# Patient Record
Sex: MALE | Race: WHITE | Employment: FULL TIME
[De-identification: names, ages, dates, MRNs, and addresses within clinical notes are randomized per-mention and may not be internally consistent; named-entity substitution may affect disease eponyms.]

---

## 2017-02-01 ENCOUNTER — OFFICE VISIT (OUTPATIENT)
Dept: PAIN MANAGEMENT | Facility: CLINIC | Age: 37
End: 2017-02-01

## 2017-02-01 VITALS
HEIGHT: 69 IN | HEART RATE: 66 BPM | DIASTOLIC BLOOD PRESSURE: 91 MMHG | WEIGHT: 202 LBS | BODY MASS INDEX: 29.92 KG/M2 | RESPIRATION RATE: 14 BRPM | SYSTOLIC BLOOD PRESSURE: 140 MMHG

## 2017-02-01 DIAGNOSIS — M54.16 LUMBAR RADICULOPATHY, CHRONIC: Primary | ICD-10-CM

## 2017-02-01 DIAGNOSIS — M47.816 OSTEOARTHRITIS OF LUMBAR SPINE, UNSPECIFIED SPINAL OSTEOARTHRITIS COMPLICATION STATUS: ICD-10-CM

## 2017-02-01 DIAGNOSIS — M51.36 DEGENERATIVE LUMBAR DISC: ICD-10-CM

## 2017-02-01 DIAGNOSIS — M48.061 SPINAL STENOSIS, LUMBAR: ICD-10-CM

## 2017-02-01 DIAGNOSIS — M47.816 ARTHROPATHY OF LUMBAR FACET JOINT: ICD-10-CM

## 2017-02-01 LAB
AMPHETAMINE SCREEN, URINE: NEGATIVE
BARBITURATE SCREEN, URINE: NEGATIVE
BENZODIAZEPINE SCREEN, URINE: NEGATIVE
COCAINE METABOLITE SCREEN URINE: NEGATIVE
MDMA URINE: NORMAL
METHADONE SCREEN, URINE: NEGATIVE
METHAMPHETAMINE, URINE: NEGATIVE
OPIATE SCREEN URINE: NEGATIVE
OXYCODONE SCREEN URINE: NEGATIVE
PHENCYCLIDINE SCREEN URINE: NEGATIVE
PROPOXYPHENE SCREEN, URINE: NORMAL
THC: NEGATIVE
TRICYCLIC ANTIDEPRESSANTS, UR: NEGATIVE

## 2017-02-01 PROCEDURE — 80305 DRUG TEST PRSMV DIR OPT OBS: CPT | Performed by: PAIN MEDICINE

## 2017-02-01 PROCEDURE — 99204 OFFICE O/P NEW MOD 45 MIN: CPT | Performed by: PAIN MEDICINE

## 2017-02-01 RX ORDER — DULOXETIN HYDROCHLORIDE 30 MG/1
CAPSULE, DELAYED RELEASE ORAL
COMMUNITY
Start: 2017-01-19 | End: 2022-01-25

## 2017-02-01 RX ORDER — SIMVASTATIN 40 MG
40 TABLET ORAL NIGHTLY
COMMUNITY
Start: 2017-01-11 | End: 2018-10-25

## 2017-02-01 RX ORDER — AMLODIPINE BESYLATE 10 MG/1
TABLET ORAL
COMMUNITY
Start: 2016-12-12 | End: 2017-11-20 | Stop reason: ALTCHOICE

## 2017-02-01 RX ORDER — BUPROPION HYDROCHLORIDE 150 MG/1
TABLET ORAL EVERY MORNING
COMMUNITY
Start: 2017-01-24 | End: 2022-01-25

## 2017-02-01 RX ORDER — LISINOPRIL 40 MG/1
40 TABLET ORAL DAILY
COMMUNITY
Start: 2017-01-19

## 2017-02-01 RX ORDER — EZETIMIBE 10 MG/1
TABLET ORAL
COMMUNITY
Start: 2016-10-25 | End: 2017-11-20 | Stop reason: ALTCHOICE

## 2017-02-01 RX ORDER — RIZATRIPTAN BENZOATE 10 MG/1
TABLET ORAL
COMMUNITY
Start: 2016-12-20

## 2017-02-01 RX ORDER — OMEGA-3-ACID ETHYL ESTERS 1 G/1
2 CAPSULE, LIQUID FILLED ORAL 2 TIMES DAILY
COMMUNITY
Start: 2016-12-14 | End: 2022-01-25

## 2017-02-01 ASSESSMENT — ENCOUNTER SYMPTOMS
ABDOMINAL PAIN: 0
VOMITING: 0
EYE DISCHARGE: 0
NAUSEA: 0
EYES NEGATIVE: 1
BACK PAIN: 1
SHORTNESS OF BREATH: 0
CONSTIPATION: 0
PHOTOPHOBIA: 0

## 2017-11-09 ENCOUNTER — TELEPHONE (OUTPATIENT)
Dept: CARDIOLOGY CLINIC | Age: 37
End: 2017-11-09

## 2017-11-09 NOTE — TELEPHONE ENCOUNTER
Called pt twice to schedule an appt per William Dugan NP. Letter mailed to contact the office to schedule.

## 2017-11-20 VITALS
HEART RATE: 70 BPM | BODY MASS INDEX: 29.47 KG/M2 | SYSTOLIC BLOOD PRESSURE: 118 MMHG | WEIGHT: 199 LBS | OXYGEN SATURATION: 94 % | RESPIRATION RATE: 16 BRPM | DIASTOLIC BLOOD PRESSURE: 84 MMHG | HEIGHT: 69 IN

## 2017-11-20 RX ORDER — HYDROCHLOROTHIAZIDE 25 MG/1
1 TABLET ORAL DAILY
COMMUNITY
Start: 2017-11-01 | End: 2022-01-25

## 2017-11-20 RX ORDER — DULOXETIN HYDROCHLORIDE 60 MG/1
1 CAPSULE, DELAYED RELEASE ORAL DAILY
COMMUNITY
Start: 2017-10-16 | End: 2022-01-25

## 2017-11-30 ENCOUNTER — TELEPHONE (OUTPATIENT)
Dept: CARDIOLOGY CLINIC | Age: 37
End: 2017-11-30

## 2018-01-25 ENCOUNTER — OFFICE VISIT (OUTPATIENT)
Dept: CARDIOLOGY CLINIC | Age: 38
End: 2018-01-25
Payer: COMMERCIAL

## 2018-01-25 VITALS
HEART RATE: 68 BPM | HEIGHT: 70 IN | SYSTOLIC BLOOD PRESSURE: 108 MMHG | DIASTOLIC BLOOD PRESSURE: 60 MMHG | BODY MASS INDEX: 28.35 KG/M2 | WEIGHT: 198 LBS

## 2018-01-25 DIAGNOSIS — F41.9 ANXIETY: ICD-10-CM

## 2018-01-25 DIAGNOSIS — R00.2 PALPITATION: Primary | ICD-10-CM

## 2018-01-25 DIAGNOSIS — I10 ESSENTIAL HYPERTENSION: ICD-10-CM

## 2018-01-25 DIAGNOSIS — R94.31 ABNORMAL HOLTER EXAM: ICD-10-CM

## 2018-01-25 DIAGNOSIS — R00.0 INAPPROPRIATE SINUS TACHYCARDIA: ICD-10-CM

## 2018-01-25 PROCEDURE — 99203 OFFICE O/P NEW LOW 30 MIN: CPT | Performed by: INTERNAL MEDICINE

## 2018-01-25 RX ORDER — METOPROLOL SUCCINATE 100 MG/1
100 TABLET, EXTENDED RELEASE ORAL DAILY
Qty: 90 TABLET | Refills: 3 | Status: SHIPPED | OUTPATIENT
Start: 2018-01-25 | End: 2019-01-14 | Stop reason: SDUPTHER

## 2018-01-25 NOTE — PROGRESS NOTES
Cardiology Consultation  [facility]    Patient is here to establish care for palpitations and sinus tach    HPI and Chief Complaint:  Deana Wells  is doing well from a cardiac standpoint. Excellent functional capacity with no significant change in functional capacity. No chest pain, no dyspnea, no PND, no syncope or pre-syncope, no orthopnea. No symptoms of CHF or angina/chest pain. Has on and off heart racing episodes. Sometimes gradual onset and offset. 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 Was pn metop 25 BID, and was ok, But had gotten worse. NP at Dr. Caridad Whitehead office put him up to TID and he feels somewhat better, but not fully resolved. REVIEW OF SYSTEMS:    · Constitutional: there has been no unanticipated weight loss. There's been No change in energy level, No change in activity level. · Eyes: No visual changes or diplopia. No scleral icterus. · ENT: No Headaches, hearing loss or vertigo. No mouth sores or sore throat. · Cardiovascular: No chest pain, no dyspnea, no chf like symptoms  · Respiratory: No previous pulmonary problems  · Gastrointestinal: No abdominal pain, appetite loss, blood in stools. No change in bowel or bladder habits. · Genitourinary: No dysuria, trouble voiding, or hematuria. · Musculoskeletal:  No gait disturbance, No weakness or joint complaints. · Integumentary: No rash or pruritis. · Neurological: No headache, diplopia, change in muscle strength, numbness or tingling. No change in gait, balance, coordination, mood, affect, memory, mentation, behavior. · Psychiatric: No new anxiety or depression. · Endocrine: No temperature intolerance. No excessive thirst, fluid intake, or urination. No tremor. · Hematologic/Lymphatic: No abnormal bruising or bleeding, blood clots or swollen lymph nodes. · Allergic/Immunologic: No nasal congestion or hives.       Physical Exam:   Vitals: /60   Pulse 68   Ht 5' 9.5\" (1.765 m)   Wt 198 lb (89.8 kg)   BMI 28.82 kg/m²   General appearance: alert and cooperative with exam  HEENT: Head: Normocephalic, no lesions, without obvious abnormality. Neck: no carotid bruit, no JVD  Lungs: clear to auscultation bilaterally  Heart: regular rate and rhythm, S1, S2 normal, no murmur, click, rub or gallop  Abdomen: soft, non-tender; bowel sounds normal; no masses,  no organomegaly  Extremities: extremities normal, atraumatic, no cyanosis or edema  Neurologic: Mental status: Alert, oriented, thought content appropriate      EKG: Normal Sinus Rhythm with no ischemic changes. LAST ECHO: 10 years ago    Last Holter: Reviewed      Past Medical and Surgical History, Problem List, Allergies, Medications, Labs, Imaging, all reviewed extensively in EMR and with the patient. Assessment and Plan:    1. Palpitations and inappropriate sinus tachycardia that's encroaching on his work. Reviewed holter. Increase metoprolol and change to once a day dosing. He was on 25 TID, change to 100 daily. All his other meds are once a day. Check echo to evaluate structural and functional heart disease. 2. HTN- stable on meds. Well controlled. 3. Anxiety - on multiple meds. PCP managing. 4. Follow up after echo. If feels much better and echo normal, can follow up every 6 months. Thank you for allowing me to participate in the care of this patient, please do not hesitate to call if you have any questions. Nayeli Kearney, [address] [facility]  [facility]. [facility]  [phone]

## 2018-01-26 ENCOUNTER — TELEPHONE (OUTPATIENT)
Dept: CARDIOLOGY CLINIC | Age: 38
End: 2018-01-26

## 2018-02-01 ENCOUNTER — TELEPHONE (OUTPATIENT)
Dept: CARDIOLOGY CLINIC | Age: 38
End: 2018-02-01

## 2018-02-01 DIAGNOSIS — R94.31 ABNORMAL HOLTER EXAM: ICD-10-CM

## 2018-02-01 DIAGNOSIS — R00.2 PALPITATION: ICD-10-CM

## 2018-10-25 ENCOUNTER — OFFICE VISIT (OUTPATIENT)
Dept: PAIN MANAGEMENT | Age: 38
End: 2018-10-25
Payer: COMMERCIAL

## 2018-10-25 VITALS
BODY MASS INDEX: 29.77 KG/M2 | HEIGHT: 69 IN | WEIGHT: 201 LBS | SYSTOLIC BLOOD PRESSURE: 124 MMHG | DIASTOLIC BLOOD PRESSURE: 86 MMHG

## 2018-10-25 DIAGNOSIS — M47.817 LUMBOSACRAL SPONDYLOSIS WITHOUT MYELOPATHY: ICD-10-CM

## 2018-10-25 DIAGNOSIS — M54.41 CHRONIC BILATERAL LOW BACK PAIN WITH BILATERAL SCIATICA: ICD-10-CM

## 2018-10-25 DIAGNOSIS — M54.42 CHRONIC BILATERAL LOW BACK PAIN WITH BILATERAL SCIATICA: ICD-10-CM

## 2018-10-25 DIAGNOSIS — G89.29 CHRONIC BILATERAL LOW BACK PAIN WITH BILATERAL SCIATICA: ICD-10-CM

## 2018-10-25 DIAGNOSIS — M54.16 LUMBAR RADICULOPATHY: ICD-10-CM

## 2018-10-25 DIAGNOSIS — M51.26 PROTRUSION OF LUMBAR INTERVERTEBRAL DISC: Primary | ICD-10-CM

## 2018-10-25 PROCEDURE — 99214 OFFICE O/P EST MOD 30 MIN: CPT | Performed by: NURSE PRACTITIONER

## 2018-10-25 ASSESSMENT — ENCOUNTER SYMPTOMS
RESPIRATORY NEGATIVE: 1
GASTROINTESTINAL NEGATIVE: 1
BACK PAIN: 1
EYES NEGATIVE: 1

## 2018-11-07 ENCOUNTER — PROCEDURE VISIT (OUTPATIENT)
Dept: PAIN MANAGEMENT | Age: 38
End: 2018-11-07
Payer: COMMERCIAL

## 2018-11-07 DIAGNOSIS — M54.16 LUMBAR RADICULOPATHY: Primary | ICD-10-CM

## 2018-11-07 DIAGNOSIS — M54.42 CHRONIC BILATERAL LOW BACK PAIN WITH BILATERAL SCIATICA: ICD-10-CM

## 2018-11-07 DIAGNOSIS — M54.41 CHRONIC BILATERAL LOW BACK PAIN WITH BILATERAL SCIATICA: ICD-10-CM

## 2018-11-07 DIAGNOSIS — G89.29 CHRONIC BILATERAL LOW BACK PAIN WITH BILATERAL SCIATICA: ICD-10-CM

## 2018-11-07 PROCEDURE — 62323 NJX INTERLAMINAR LMBR/SAC: CPT | Performed by: PHYSICAL MEDICINE & REHABILITATION

## 2018-11-12 ENCOUNTER — OFFICE VISIT (OUTPATIENT)
Dept: CARDIOLOGY CLINIC | Age: 38
End: 2018-11-12
Payer: COMMERCIAL

## 2018-11-12 VITALS
BODY MASS INDEX: 29.33 KG/M2 | HEIGHT: 69 IN | DIASTOLIC BLOOD PRESSURE: 82 MMHG | HEART RATE: 72 BPM | SYSTOLIC BLOOD PRESSURE: 112 MMHG | WEIGHT: 198 LBS

## 2018-11-12 DIAGNOSIS — R00.2 PALPITATION: ICD-10-CM

## 2018-11-12 DIAGNOSIS — F41.9 ANXIETY: ICD-10-CM

## 2018-11-12 DIAGNOSIS — R94.31 ABNORMAL HOLTER EXAM: ICD-10-CM

## 2018-11-12 DIAGNOSIS — R00.0 INAPPROPRIATE SINUS TACHYCARDIA: Primary | ICD-10-CM

## 2018-11-12 PROCEDURE — 99213 OFFICE O/P EST LOW 20 MIN: CPT | Performed by: INTERNAL MEDICINE

## 2018-11-21 ENCOUNTER — PROCEDURE VISIT (OUTPATIENT)
Dept: PAIN MANAGEMENT | Age: 38
End: 2018-11-21
Payer: COMMERCIAL

## 2018-11-21 DIAGNOSIS — M54.16 LUMBAR RADICULOPATHY: ICD-10-CM

## 2018-11-21 PROCEDURE — 62323 NJX INTERLAMINAR LMBR/SAC: CPT | Performed by: PHYSICAL MEDICINE & REHABILITATION

## 2018-11-21 NOTE — PROGRESS NOTES
epidural space. No vascular spread was noted. Digital subtraction was not employed to evaluate for vascular spread. The patient was monitored for any untoward reaction to contrast medium before proceeding. The patient did not report pain reproduction in a concordant distribution. Following needle position verification, a test dose of  .5 mL of sterile normal saline was administered and patient monitored for any adverse effects. Then, 1 mL of Dexamethasone (Decadron 10mg/mL) was instilled into the epidural space and the patient's response was again monitored. Finally, .1ml of 0.5% bupivacaine was then instilled. The patient's response was again monitored. The spinal needle was removed, and the patient's pain response, gait pattern, sensorimotor examination and range of motion were examined. The patient tolerated the procedure well and without complications and was noted to be in stable condition prior to discharge from the procedure center with discharge instructions. IMPRESSIONS:  1. L5-S1 Interlaminar epidurogram, epidural anesthesia and epidural steroid injection procedures accomplished without incident. EBL: no blood loss    SPECIMEN: none    RECOMMENDATIONS:  1. Complete and return Post-Procedure Pain and Activity Diary.    2. Contact the [address] for symptom exacerbation, fever or unusual symptoms. 3. Post-procedure care according to verbal and written discharge instructions    POST-PROCEDURE EPIDUROGRAPHY INTERPRETATION:    EXAMINATION: AP, lateral views. FLUORO TIME: 24 seconds    DISCUSSION: Spot views of the spine reveal normal alignment and segmentation. Spinal needle is positioned at the L5-S1 interlaminar space. Contrast spreads and outlines the epidural space. The epidurogram reveals excellent contrast flow. Visualized spine reveals See radiology report. Soft tissues reveal no abnormalities.     IMPRESSION: L5-S1 interlaminar epidurogram/epineurogram reveals satisfactory needle position and contrast spread.      Electronically signed by Dimple Fish MD on 11/21/2018 at 2:45 PM

## 2018-11-28 ENCOUNTER — HOSPITAL ENCOUNTER (OUTPATIENT)
Age: 38
Discharge: HOME | End: 2018-11-28

## 2018-11-28 LAB — RUBV IGG SER QL: 94.6 IU/ML

## 2018-11-28 PROCEDURE — 86735 MUMPS ANTIBODY: CPT

## 2018-11-28 PROCEDURE — 86765 RUBEOLA ANTIBODY: CPT

## 2018-11-28 PROCEDURE — 86481 TB AG RESPONSE T-CELL SUSP: CPT

## 2018-11-28 PROCEDURE — 86787 VARICELLA-ZOSTER ANTIBODY: CPT

## 2018-11-28 PROCEDURE — 86762 RUBELLA ANTIBODY: CPT

## 2018-11-30 LAB
MEASLES IMMUNE (IGG): 1.45
MUV IGG SER QL: 3.65
VZV IGG SER QL IA: 3.21

## 2018-12-03 LAB — T-SPOT TB TEST: NORMAL

## 2019-01-15 RX ORDER — METOPROLOL SUCCINATE 100 MG/1
100 TABLET, EXTENDED RELEASE ORAL DAILY
Qty: 90 TABLET | Refills: 3 | Status: SHIPPED | OUTPATIENT
Start: 2019-01-15

## 2019-01-23 ENCOUNTER — TELEPHONE (OUTPATIENT)
Dept: PAIN MANAGEMENT | Age: 39
End: 2019-01-23

## 2019-01-23 ENCOUNTER — OFFICE VISIT (OUTPATIENT)
Dept: PAIN MANAGEMENT | Age: 39
End: 2019-01-23
Payer: COMMERCIAL

## 2019-01-23 VITALS
SYSTOLIC BLOOD PRESSURE: 130 MMHG | DIASTOLIC BLOOD PRESSURE: 92 MMHG | HEART RATE: 92 BPM | WEIGHT: 202 LBS | BODY MASS INDEX: 29.92 KG/M2 | HEIGHT: 69 IN

## 2019-01-23 DIAGNOSIS — M47.816 LUMBAR FACET JOINT SYNDROME: Primary | ICD-10-CM

## 2019-01-23 DIAGNOSIS — M54.16 LUMBAR RADICULOPATHY: ICD-10-CM

## 2019-01-23 DIAGNOSIS — M54.06 PANNICULITIS AFFECTING REGIONS OF NECK AND BACK, LUMBAR REGION: ICD-10-CM

## 2019-01-23 PROCEDURE — 99214 OFFICE O/P EST MOD 30 MIN: CPT | Performed by: PHYSICAL MEDICINE & REHABILITATION

## 2019-01-23 ASSESSMENT — ENCOUNTER SYMPTOMS
BACK PAIN: 1
NAUSEA: 0
RESPIRATORY NEGATIVE: 1
CONSTIPATION: 0
EYES NEGATIVE: 1
ALLERGIC/IMMUNOLOGIC NEGATIVE: 1

## 2019-02-04 ENCOUNTER — TELEPHONE (OUTPATIENT)
Dept: PAIN MANAGEMENT | Age: 39
End: 2019-02-04

## 2019-02-27 ENCOUNTER — PROCEDURE VISIT (OUTPATIENT)
Dept: PAIN MANAGEMENT | Age: 39
End: 2019-02-27
Payer: COMMERCIAL

## 2019-02-27 DIAGNOSIS — M47.816 LUMBAR FACET JOINT SYNDROME: ICD-10-CM

## 2019-02-27 DIAGNOSIS — M54.16 LUMBAR RADICULOPATHY: ICD-10-CM

## 2019-02-27 PROCEDURE — 64495 INJ PARAVERT F JNT L/S 3 LEV: CPT | Performed by: PHYSICAL MEDICINE & REHABILITATION

## 2019-02-27 PROCEDURE — 64493 INJ PARAVERT F JNT L/S 1 LEV: CPT | Performed by: PHYSICAL MEDICINE & REHABILITATION

## 2019-02-27 PROCEDURE — 64494 INJ PARAVERT F JNT L/S 2 LEV: CPT | Performed by: PHYSICAL MEDICINE & REHABILITATION

## 2019-10-16 ENCOUNTER — TELEPHONE (OUTPATIENT)
Dept: PAIN MANAGEMENT | Age: 39
End: 2019-10-16

## 2019-10-16 ENCOUNTER — OFFICE VISIT (OUTPATIENT)
Dept: PAIN MANAGEMENT | Age: 39
End: 2019-10-16
Payer: COMMERCIAL

## 2019-10-16 VITALS
DIASTOLIC BLOOD PRESSURE: 76 MMHG | RESPIRATION RATE: 16 BRPM | SYSTOLIC BLOOD PRESSURE: 118 MMHG | WEIGHT: 190 LBS | HEIGHT: 69 IN | BODY MASS INDEX: 28.14 KG/M2 | HEART RATE: 68 BPM

## 2019-10-16 DIAGNOSIS — M54.16 LUMBAR RADICULOPATHY: Primary | ICD-10-CM

## 2019-10-16 DIAGNOSIS — M47.816 LUMBAR FACET JOINT SYNDROME: ICD-10-CM

## 2019-10-16 DIAGNOSIS — M47.817 LUMBOSACRAL SPONDYLOSIS WITHOUT MYELOPATHY: ICD-10-CM

## 2019-10-16 PROCEDURE — 99214 OFFICE O/P EST MOD 30 MIN: CPT | Performed by: PHYSICAL MEDICINE & REHABILITATION

## 2019-10-16 ASSESSMENT — ENCOUNTER SYMPTOMS
NAUSEA: 0
BACK PAIN: 1
EYES NEGATIVE: 1
CONSTIPATION: 0
ALLERGIC/IMMUNOLOGIC NEGATIVE: 1
RESPIRATORY NEGATIVE: 1

## 2019-12-27 ENCOUNTER — TELEPHONE (OUTPATIENT)
Dept: PAIN MANAGEMENT | Age: 39
End: 2019-12-27

## 2020-12-31 LAB
ALBUMIN: 4.5 G/DL (ref 3.5–5.2)
ALP BLD-CCNC: 43 U/L (ref 40–150)
ALT SERPL-CCNC: 19 U/L (ref 0–55)
ANION GAP SERPL CALCULATED.3IONS-SCNC: 13 MEQ/L (ref 5–13)
AST SERPL-CCNC: 19 U/L (ref 5–34)
BILIRUB SERPL-MCNC: 0.6 MG/DL (ref 0–1)
BUN BLDV-MCNC: 11 MG/DL (ref 7–18)
CALCIUM SERPL-MCNC: 9.9 MG/DL (ref 8.4–10.2)
CHLORIDE BLD-SCNC: 101 MMOL/L (ref 98–107)
CHOLESTEROL/HDL RELATIVE RISK: 8.28
CHOLESTEROL: 356 MG/DL
CO2: 25 MMOL/L (ref 22–29)
CREAT SERPL-MCNC: 0.99 MG/DL (ref 0.72–1.25)
GFR CALCULATED: > 60
GLUCOSE: 90 MG/DL (ref 70–99)
HDLC SERPL-MCNC: 43 MG/DL (ref 45–60)
LDL CHOLESTEROL: ABNORMAL MG/DL
POTASSIUM SERPL-SCNC: 4.4 MMOL/L (ref 3.5–5.1)
SODIUM BLD-SCNC: 139 MMOL/L (ref 136–145)
TOTAL PROTEIN: 7.8 G/DL (ref 6.4–8.2)
TRIGL SERPL-MCNC: 806 MG/DL
VLDLC SERPL CALC-MCNC: 161 MG/DL (ref 5–35)

## 2021-04-05 LAB
CHOLESTEROL/HDL RELATIVE RISK: 5.05
CHOLESTEROL: 192 MG/DL
HDLC SERPL-MCNC: 38 MG/DL (ref 45–60)
TRIGL SERPL-MCNC: 632 MG/DL
VLDLC SERPL CALC-MCNC: 126 MG/DL (ref 5–35)

## 2022-01-25 ENCOUNTER — OFFICE VISIT (OUTPATIENT)
Dept: PAIN MANAGEMENT | Age: 42
End: 2022-01-25
Payer: COMMERCIAL

## 2022-01-25 VITALS
BODY MASS INDEX: 28.58 KG/M2 | SYSTOLIC BLOOD PRESSURE: 120 MMHG | HEIGHT: 69 IN | WEIGHT: 193 LBS | DIASTOLIC BLOOD PRESSURE: 88 MMHG

## 2022-01-25 DIAGNOSIS — M51.36 DDD (DEGENERATIVE DISC DISEASE), LUMBAR: Primary | ICD-10-CM

## 2022-01-25 DIAGNOSIS — M47.816 LUMBAR FACET JOINT SYNDROME: ICD-10-CM

## 2022-01-25 DIAGNOSIS — M47.817 LUMBOSACRAL SPONDYLOSIS WITHOUT MYELOPATHY: ICD-10-CM

## 2022-01-25 PROCEDURE — 99214 OFFICE O/P EST MOD 30 MIN: CPT | Performed by: PHYSICAL MEDICINE & REHABILITATION

## 2022-01-25 PROCEDURE — G8427 DOCREV CUR MEDS BY ELIG CLIN: HCPCS | Performed by: PHYSICAL MEDICINE & REHABILITATION

## 2022-01-25 PROCEDURE — G8419 CALC BMI OUT NRM PARAM NOF/U: HCPCS | Performed by: PHYSICAL MEDICINE & REHABILITATION

## 2022-01-25 PROCEDURE — 1036F TOBACCO NON-USER: CPT | Performed by: PHYSICAL MEDICINE & REHABILITATION

## 2022-01-25 PROCEDURE — G8484 FLU IMMUNIZE NO ADMIN: HCPCS | Performed by: PHYSICAL MEDICINE & REHABILITATION

## 2022-01-25 RX ORDER — VENLAFAXINE HYDROCHLORIDE 150 MG/1
1 CAPSULE, EXTENDED RELEASE ORAL DAILY
COMMUNITY
Start: 2022-01-11

## 2022-01-25 RX ORDER — OXYCODONE HYDROCHLORIDE AND ACETAMINOPHEN 5; 325 MG/1; MG/1
1 TABLET ORAL DAILY PRN
Qty: 30 TABLET | Refills: 0 | Status: SHIPPED | OUTPATIENT
Start: 2022-01-25 | End: 2022-01-25 | Stop reason: SDUPTHER

## 2022-01-25 RX ORDER — TRAZODONE HYDROCHLORIDE 50 MG/1
1 TABLET ORAL NIGHTLY
COMMUNITY
Start: 2021-12-16 | End: 2022-03-16

## 2022-01-25 RX ORDER — OXYCODONE HYDROCHLORIDE AND ACETAMINOPHEN 5; 325 MG/1; MG/1
1 TABLET ORAL PRN
COMMUNITY
Start: 2021-11-04 | End: 2022-01-25 | Stop reason: SDUPTHER

## 2022-01-25 RX ORDER — IBUPROFEN 800 MG/1
800 TABLET ORAL EVERY 6 HOURS PRN
COMMUNITY

## 2022-01-25 RX ORDER — ACETAMINOPHEN 500 MG
1000 TABLET ORAL EVERY 8 HOURS
COMMUNITY

## 2022-01-25 RX ORDER — METFORMIN HYDROCHLORIDE 750 MG/1
1 TABLET, EXTENDED RELEASE ORAL DAILY
COMMUNITY
Start: 2021-11-24

## 2022-01-25 RX ORDER — EVOLOCUMAB 140 MG/ML
140 INJECTION, SOLUTION SUBCUTANEOUS
COMMUNITY
Start: 2021-12-18

## 2022-01-25 RX ORDER — ROSUVASTATIN CALCIUM 10 MG/1
1 TABLET, COATED ORAL DAILY
COMMUNITY
Start: 2021-12-23

## 2022-01-25 RX ORDER — OXYCODONE HYDROCHLORIDE AND ACETAMINOPHEN 5; 325 MG/1; MG/1
1 TABLET ORAL DAILY PRN
Qty: 30 TABLET | Refills: 0 | Status: SHIPPED | OUTPATIENT
Start: 2022-01-25 | End: 2022-02-24

## 2022-01-25 ASSESSMENT — ENCOUNTER SYMPTOMS
EYES NEGATIVE: 1
BACK PAIN: 1
ALLERGIC/IMMUNOLOGIC NEGATIVE: 1
RESPIRATORY NEGATIVE: 1
NAUSEA: 0
CONSTIPATION: 0

## 2022-01-25 NOTE — TELEPHONE ENCOUNTER
Pt called and asked his prescription be sent to [facility] outpatient pharmacy, not to [facility].    [phone]    Last Appt:  1/25/2022  Next Appt:   3/16/2022

## 2022-01-25 NOTE — LETTER
[address] Pain Management A department of [place] [address]  Phone: [phone]  Fax: [phone]    Saul Rico MD    January 25, 2022     Babita Solis MD  [address] [place] [address]    Patient: Lennie King   MR Number: [id number]   YOB: 1980   Date of Visit: 1/25/2022       Dear Babita Solis:    Thank you for referring Paco Faye to me for evaluation/treatment. Below are the relevant portions of my assessment and plan of care. If you have questions, please do not hesitate to call me. I look forward to following Corby Cristina along with you.     Sincerely,      Saul Rico MD

## 2022-01-25 NOTE — PROGRESS NOTES
PAIN MANAGEMENT FOLLOW-UP NOTE  1/25/22    CHIEF COMPLAINT: This is a36 y.o. male patientwho returns to the Pain Management Clinic with a history of Follow-up (back pain) and Other (MRI at [facility] 6 months ago. )      Merline Arriola returns today for  reevaluation. Since the visit, the patient reports that the pain is not changed. Continues to have B anterior  Thigh  Pain  And  Pain  Down L leg posterior. Notes  Spine surgery was recommended in 2015  Patient  Elected against  This. Had been treated by me until 2019 then    Dr. Char Noel  In [facility]  Who has evaluated  For  This, did Left lumbar TFE  2 months  Ago  Minimal relief had performed   Diagnostic MBB that  Was negative    Also had discussed  SCS  Patient  Is active at work so wanted to hold off . Now has new insurance   Last prescribed in  November    Percocet  5 325 1/2 po BID prn  #30     Location: Back  Location Modifier: -  Severity of Pain: 2  Duration of Pain: Persistent  Frequency of Pain: Intermittent  Aggravating Factors: -  Limiting Behavior: Bending   Relieving Factors: relaxation        Previous pain medication trials have included:          Mental health:    Patient feels anxiety secondary to their current pain problems as described above. H/O depression and anxiety: No   Patient is not seeing psychologist orpsychiatrist   Abuse history? No    Employed? No    ANALGESIA:   Are your Current Pain medication (s) helping to decrease pain? No.   Current Pain score:      ADVERSE AFFECTS:   Medication Side Effects: No.    ACTIVITY:  Are you able to be more active with your pain medications? No      ABERRANT BEHAVIORS SINCE LAST VISIT? No    Review of Systems   Constitutional: Positive for fatigue. HENT: Negative. Eyes: Negative. Respiratory: Negative. Cardiovascular: Negative. Gastrointestinal: Negative for constipation and nausea. Endocrine: Negative. Genitourinary: Negative for difficulty urinating. Musculoskeletal: Positive for arthralgias, back pain and myalgias. Skin: Negative. Allergic/Immunologic: Negative. Neurological: Positive for weakness and numbness. Hematological: Negative. Psychiatric/Behavioral: Positive for sleep disturbance. All other systems reviewed and are negative. Allergies   Allergen Reactions    Penicillins Hives and Swelling       Outpatient Medications Prior to Visit   Medication Sig Dispense Refill    REPATHA SURECLICK 640 MG/ML SOAJ Inject 140 mg into the skin every 14 days      metFORMIN (GLUCOPHAGE-XR) 750 MG extended release tablet Take 1 tablet by mouth daily      rosuvastatin (CRESTOR) 10 MG tablet Take 1 tablet by mouth daily      traZODone (DESYREL) 50 MG tablet Take 1 tablet by mouth at bedtime      venlafaxine (EFFEXOR XR) 150 MG extended release capsule Take 1 capsule by mouth daily      ibuprofen (ADVIL;MOTRIN) 800 MG tablet Take 800 mg by mouth every 6 hours as needed for Pain      acetaminophen (TYLENOL) 500 MG tablet Take 1,000 mg by mouth every 8 hours      oxyCODONE-acetaminophen (PERCOCET) 5-325 MG per tablet Take 1 tablet by mouth as needed.  metoprolol succinate (TOPROL XL) 100 MG extended release tablet Take 1 tablet by mouth daily 90 tablet 3    traMADol-acetaminophen (ULTRACET) 37.5-325 MG per tablet Take 1-2 tablets by mouth every 6 hours as needed for Pain .       lisinopril (PRINIVIL;ZESTRIL) 40 MG tablet Take 40 mg by mouth daily       rizatriptan (MAXALT) 10 MG tablet Take by mouth once as needed       DULoxetine (CYMBALTA) 60 MG extended release capsule Take 1 capsule by mouth daily (Patient not taking: Reported on 1/25/2022)      hydrochlorothiazide (HYDRODIURIL) 25 MG tablet Take 1 tablet by mouth daily (Patient not taking: Reported on 1/25/2022)      buPROPion (WELLBUTRIN XL) 150 MG extended release tablet Take by mouth every morning  (Patient not taking: Reported on 1/25/2022)      DULoxetine (CYMBALTA) 30 MG extended release capsule  (Patient not taking: Reported on 1/25/2022)      omega-3 acid ethyl esters (LOVAZA) 1 G capsule Take 2 g by mouth 2 times daily  (Patient not taking: Reported on 1/25/2022)       No facility-administered medications prior to visit. Past Medical History:   Diagnosis Date    DDD (degenerative disc disease), cervical     DDD (degenerative disc disease), lumbosacral     Depression     Depression     Herniated disc, cervical     High triglycerides     Hyperlipidemia     Hypertension     Knee pain, right     Lumbosacral disc herniation     Migraine     Migraine     Neuroforaminal stenosis of lumbar spine     Osteochondritis dissecans of knee, right     Pain in joint, lower leg     Plica syndrome of knee, right        Past Surgical History:   Procedure Laterality Date    FACET JOINT INJECTION Bilateral 02/27/2019    Bilateral L3-L4,L4-L5,L5-S1 Facet injection: [address] ARTHROSCOPY Right     OTHER SURGICAL HISTORY  11/7/18 and 11/21/18    Lumbar Interlaminar Epidural Steroid Injection     TONSILLECTOMY       Family History   Problem Relation Age of Onset    Hypertension Mother     Stroke Mother     Hypertension Father     High Cholesterol Father     Hypertension Maternal Grandmother      Social History     Socioeconomic History    Marital status:      Spouse name: None    Number of children: None    Years of education: None    Highest education level: None   Occupational History    None   Tobacco Use    Smoking status: Never Smoker    Smokeless tobacco: Never Used   Substance and Sexual Activity    Alcohol use:  Yes     Alcohol/week: 5.0 standard drinks     Types: 5 Cans of beer per week    Drug use: No    Sexual activity: Yes   Other Topics Concern    None   Social History Narrative    None     Social Determinants of Health     Financial Resource Strain:     Difficulty of Paying Living Expenses: Not on file   Food Insecurity:     Worried About [facility] in the Last Year: Not on file    Rolan of Food in the Last Year: Not on file   Transportation Needs:     Lack of Transportation (Medical): Not on file    Lack of Transportation (Non-Medical): Not on file   Physical Activity:     Days of Exercise per Week: Not on file    Minutes of Exercise per Session: Not on file   Stress:     Feeling of Stress : Not on file   Social Connections:     Frequency of Communication with Friends and Family: Not on file    Frequency of Social Gatherings with Friends and Family: Not on file    Attends Jewish Services: Not on file    Active Member of [address] [place] [address] or Organizations: Not on file    Attends Club or Organization Meetings: Not on file    Marital Status: Not on file   Intimate Partner Violence:     Fear of Current or Ex-Partner: Not on file    Emotionally Abused: Not on file    Physically Abused: Not on file    Sexually Abused: Not on file   Housing Stability:     Unable to Pay for Housing in the Last Year: Not on file    Number of [place] in the Last Year: Not on file    Unstable Housing in the Last Year: Not on file         Family and Social Historyreviewed in the [id number] [place] [address] record. Imaging:Reviewed available imaging in our system with the patient. No results found. Objective:     Physical Exam:  Vitals:    01/25/22 0855   BP: 120/88   Weight: 193 lb (87.5 kg)   Height: 5' 9\" (1.753 m)          Physical Exam  Constitutional:       Appearance: He is well-developed. HENT:      Head: Normocephalic and atraumatic. Cardiovascular:      Pulses: Normal pulses. Comments: Warm extremities. Normal capillary refill. Pulmonary:      Effort: Pulmonary effort is normal.   Abdominal:      General: Abdomen is flat. Palpations: Abdomen is soft. Musculoskeletal:      Lumbar back: Negative right straight leg raise test and negative left straight leg raise test.   Skin:     General: Skin is warm and dry. Neurological:      General: No focal deficit present. Mental Status: He is alert and oriented to person, place, and time. Cranial Nerves: No cranial nerve deficit. Sensory: No sensory deficit. Motor: No atrophy or abnormal muscle tone. Deep Tendon Reflexes: Reflexes are normal and symmetric. Psychiatric:         Mood and Affect: Mood normal.         Speech: Speech normal.         Behavior: Behavior normal.       Back Exam     Range of Motion   Extension: normal   Flexion: normal   Lateral bend right: normal   Lateral bend left: normal   Rotation right: normal   Rotation left: normal     Muscle Strength   Right Quadriceps:  5/5   Left Quadriceps:  5/5   Right Hamstrings:  5/5   Left Hamstrings:  5/5     Tests   Straight leg raise right: negative  Straight leg raise left: negative    Other   Toe walk: normal  Heel walk: normal  Sensation: normal  Gait: normal                                   Research  has found that  Spine injections    reduce pain and  give  better functional  outcomes. Assessment: This is a 42 y.o. male patient with:    Diagnosis:   Diagnosis Orders   1. DDD (degenerative disc disease), lumbar  oxyCODONE-acetaminophen (PERCOCET) 5-325 MG per tablet   2. Lumbar facet joint syndrome     3. Lumbosacral spondylosis without myelopathy         Medical Comorbidities:  As listed in the patient's past medical and surgical history    Functional Limitations:   Pain limits function and quality of life. Plan: Will prescribe  Non  Contract      percocet 5 325 1 po QD prn  Consider  B l4-5 5-5S1 facet steroid injection    Meds:   Controlled Substances Monitoring: Pt educated about the risks of taking opiates,including increased sedation, constipation, slowed breathing, tolerance, dependence,and addiction. New Prescriptions    OXYCODONE-ACETAMINOPHEN (PERCOCET) 5-325 MG PER TABLET    Take 1 tablet by mouth daily as needed for Pain for up to 30 days.       Orders Placed This Encounter   Medications    oxyCODONE-acetaminophen (PERCOCET) 5-325 MG per tablet     Sig: Take 1 tablet by mouth daily as needed for Pain for up to 30 days. Dispense:  30 tablet     Refill:  0     Reduce doses taken as pain becomes manageable     No orders of the defined types were placed in this encounter. No follow-ups on file. Opioid medication has  significant  risk  benefit concerns. We  instruct    our patients that  a Random Urine Drug Screen is required  along with a  an Opioid assessment questionaire such as ORT  or SOAPP  The patient expressed understanding of the above assessment and plan. Totaltime spent face to face with patient was 25 minutes inwhich  50% or more of the time was spent in counseling, education about risk andbenefits of the above plan, and coordination of care.

## 2022-03-16 ENCOUNTER — TELEPHONE (OUTPATIENT)
Dept: PAIN MANAGEMENT | Age: 42
End: 2022-03-16

## 2022-03-16 ENCOUNTER — OFFICE VISIT (OUTPATIENT)
Dept: PAIN MANAGEMENT | Age: 42
End: 2022-03-16
Payer: COMMERCIAL

## 2022-03-16 VITALS — SYSTOLIC BLOOD PRESSURE: 122 MMHG | DIASTOLIC BLOOD PRESSURE: 78 MMHG

## 2022-03-16 DIAGNOSIS — M54.06 PANNICULITIS INVOLVING LUMBAR REGION: ICD-10-CM

## 2022-03-16 DIAGNOSIS — M47.816 LUMBAR FACET JOINT SYNDROME: Primary | ICD-10-CM

## 2022-03-16 PROCEDURE — 99214 OFFICE O/P EST MOD 30 MIN: CPT | Performed by: PHYSICAL MEDICINE & REHABILITATION

## 2022-03-16 PROCEDURE — 1036F TOBACCO NON-USER: CPT | Performed by: PHYSICAL MEDICINE & REHABILITATION

## 2022-03-16 PROCEDURE — G8419 CALC BMI OUT NRM PARAM NOF/U: HCPCS | Performed by: PHYSICAL MEDICINE & REHABILITATION

## 2022-03-16 PROCEDURE — G8427 DOCREV CUR MEDS BY ELIG CLIN: HCPCS | Performed by: PHYSICAL MEDICINE & REHABILITATION

## 2022-03-16 PROCEDURE — G8484 FLU IMMUNIZE NO ADMIN: HCPCS | Performed by: PHYSICAL MEDICINE & REHABILITATION

## 2022-03-16 RX ORDER — EZETIMIBE 10 MG/1
1 TABLET ORAL DAILY
COMMUNITY
Start: 2022-01-12

## 2022-03-16 ASSESSMENT — ENCOUNTER SYMPTOMS
RESPIRATORY NEGATIVE: 1
EYES NEGATIVE: 1
NAUSEA: 0
BACK PAIN: 1
ALLERGIC/IMMUNOLOGIC NEGATIVE: 1
CONSTIPATION: 0

## 2022-03-16 NOTE — TELEPHONE ENCOUNTER
[facility] PA check for Facet santi L4-5, L5-S1  CPT [id number], [id number], Dx M47.816    I spoke to Adrianne at [facility].  No PA required. Frequency limitation is 12 in 180 days. Ref [id number]    Scheduled at [facility] 4/6.

## 2022-03-16 NOTE — PATIENT INSTRUCTIONS
My Chart Information    Go to [email or web address]    UsernamAntoniokeith De Paz  Password: [email or web address]  [address], [place]    PROCEDURE INSTRUCTIONS FOR PAIN MANAGEMENT PROCEDURES    You are scheduled to see Dr. Dia Garcia to undergo the following procedure: Facet Bilaterl L4-5, L5-S1    Procedure Date:  Wed April 6, 2022    Arrival Time: Neetu Geiger will receive a call from [facility] to inform you of your arrival time the day prior to your procedure. Enter the facility through the ER doors and take the elevator to the 2nd floor and check in at same day surgery. 1. Stop the following medications prior to the procedure:   N/A    2. Take all routine medications unless otherwise instructed. Ok to take vitamins and antiinflammatory medications    3. EATING & DRINKING:  YOUR PROCEDURE MAY REQUIRE ANESTHESIA, NO FOOD OR LIQUIDS FOR 8 HOURS PRIOR TO THE PROCEDURE    4. If you are allergic to contrast or iodine, you must take benadryl and prednisone prior to the injection to prevent an allergic reaction. Follow the directions on the prescription for the times to take the medication. 5.  Wear simple loose clothing, which can be easily changed. 6.  Leave jewelry (including rings) and other valuables at home. 7.  Make arrangements for a family member or friend to drive you to the surgery center. Your ride must stay in the hospital while you are having the injection done. The sedation may affect your judgment following the procedure and driving a vehicle within 24 hours after the sedation could be dangerous. 8.  You will be asked to sign several forms prior to surgery; patients under the age of 25 must have a parent or legal guardian sign the permit to be able to do the procedure. 9.  You must have finished any antibiotic prescribed for recent infections.  If required, please take pre-procedure antibiotic or other pre-procedure medications as instructed. 10. Bring inhalers and pain medications with you to your procedure. 11. Bring your MRI/CT films if they were done outside of the [facility]. 12. If you should develop a cold, sore throat, cough, fever or other new indication of illness or infection, or are started on antibiotics within 2 weeks of the scheduled procedure, please notify the [facility] office as early as possible at [phone].

## 2022-03-16 NOTE — PROGRESS NOTES
PAIN MANAGEMENT FOLLOW-UP NOTE  3/16/22    CHIEF COMPLAINT: This is a36 y.o. male patientwho returns to the [facility] with a history of Follow-up (2 month f/u, DDD) and Pain (lower back and bilateral leg pain. l )      PAIN HPI:Bernardino Cunningham returns today for  reevaluation. Since the visit, the patient reports that the pain is not changed. B lumbar pain worse with cold weather   Down entire  Legs upper and occasionally numbness     feets feet weak      Location: Back  Location Modifier: entire back  Severity of Pain: 2  Duration of Pain: Intermittent  Frequency of Pain: Intermittent  Aggravating Factors: bending forwards  Limiting Behavior: Standing   Relieving Factors: relaxation        Previous pain medication trials have included:          Mental health:    Patient feels - secondary to their current pain problems as described above. H/O depression and anxiety: No   Patient is not seeing psychologist orpsychiatrist   Abuse history? No    Employed? No    ANALGESIA:   Are your Current Pain medication (s) helping to decrease pain? No.   Current Pain score:      ADVERSE AFFECTS:   Medication Side Effects: No.    ACTIVITY:  Are you able to be more active with your pain medications? No      ABERRANT BEHAVIORS SINCE LAST VISIT? No    Review of Systems   Constitutional: Positive for fatigue. HENT: Negative. Eyes: Negative. Respiratory: Negative. Cardiovascular: Negative. Gastrointestinal: Negative for constipation and nausea. Endocrine: Negative. Genitourinary: Negative for difficulty urinating. Musculoskeletal: Positive for arthralgias, back pain and myalgias. Skin: Negative. Allergic/Immunologic: Negative. Neurological: Positive for weakness and numbness. Hematological: Negative. Psychiatric/Behavioral: Positive for sleep disturbance. All other systems reviewed and are negative.        Allergies   Allergen Reactions    Penicillins Hives and Swelling       Outpatient Medications Prior to Visit   Medication Sig Dispense Refill    ezetimibe (ZETIA) 10 MG tablet Take 1 tablet by mouth daily      REPATHA SURECLICK 610 MG/ML SOAJ Inject 140 mg into the skin every 14 days      metFORMIN (GLUCOPHAGE-XR) 750 MG extended release tablet Take 1 tablet by mouth daily      rosuvastatin (CRESTOR) 10 MG tablet Take 1 tablet by mouth daily      venlafaxine (EFFEXOR XR) 150 MG extended release capsule Take 1 capsule by mouth daily      ibuprofen (ADVIL;MOTRIN) 800 MG tablet Take 800 mg by mouth every 6 hours as needed for Pain      acetaminophen (TYLENOL) 500 MG tablet Take 1,000 mg by mouth every 8 hours      metoprolol succinate (TOPROL XL) 100 MG extended release tablet Take 1 tablet by mouth daily 90 tablet 3    lisinopril (PRINIVIL;ZESTRIL) 40 MG tablet Take 40 mg by mouth daily       rizatriptan (MAXALT) 10 MG tablet Take by mouth once as needed       traZODone (DESYREL) 50 MG tablet Take 1 tablet by mouth at bedtime (Patient not taking: Reported on 3/16/2022)      traMADol-acetaminophen (ULTRACET) 37.5-325 MG per tablet Take 1-2 tablets by mouth every 6 hours as needed for Pain . No facility-administered medications prior to visit.        Past Medical History:   Diagnosis Date    DDD (degenerative disc disease), cervical     DDD (degenerative disc disease), lumbosacral     Depression     Depression     Herniated disc, cervical     High triglycerides     Hyperlipidemia     Hypertension     Knee pain, right     Lumbosacral disc herniation     Migraine     Migraine     Neuroforaminal stenosis of lumbar spine     Osteochondritis dissecans of knee, right     Pain in joint, lower leg     Plica syndrome of knee, right        Past Surgical History:   Procedure Laterality Date    FACET JOINT INJECTION Bilateral 02/27/2019    Bilateral L3-L4,L4-L5,L5-S1 Facet injection: [address] ARTHROSCOPY Right     OTHER SURGICAL HISTORY  11/7/18 and 11/21/18 Lumbar Interlaminar Epidural Steroid Injection     TONSILLECTOMY       Family History   Problem Relation Age of Onset    Hypertension Mother     Stroke Mother     Hypertension Father     High Cholesterol Father     Hypertension Maternal Grandmother      Social History     Socioeconomic History    Marital status:      Spouse name: None    Number of children: None    Years of education: None    Highest education level: None   Occupational History    None   Tobacco Use    Smoking status: Never Smoker    Smokeless tobacco: Never Used   Substance and Sexual Activity    Alcohol use: Yes     Alcohol/week: 5.0 standard drinks     Types: 5 Cans of beer per week    Drug use: No    Sexual activity: Yes   Other Topics Concern    None   Social History Narrative    None     Social Determinants of Health     Financial Resource Strain:     Difficulty of Paying Living Expenses: Not on file   Food Insecurity:     Worried About Running Out of Food in the Last Year: Not on file    Rolan of Food in the Last Year: Not on file   Transportation Needs:     Lack of Transportation (Medical): Not on file    Lack of Transportation (Non-Medical):  Not on file   Physical Activity:     Days of Exercise per Week: Not on file    Minutes of Exercise per Session: Not on file   Stress:     Feeling of Stress : Not on file   Social Connections:     Frequency of Communication with Friends and Family: Not on file    Frequency of Social Gatherings with Friends and Family: Not on file    Attends Buddhist Services: Not on file    Active Member of Clubs or Organizations: Not on file    Attends Club or Organization Meetings: Not on file    Marital Status: Not on file   Intimate Partner Violence:     Fear of Current or Ex-Partner: Not on file    Emotionally Abused: Not on file    Physically Abused: Not on file    Sexually Abused: Not on file   Housing Stability:     Unable to Pay for Housing in the Last Year: Not on file    Number of Places Lived in the Last Year: Not on file    Unstable Housing in the Last Year: Not on file         Family and Social Historyreviewed in the electronic medical record. Imaging:Reviewed available imaging in our system with the patient. No results found. Objective:     Physical Exam:  Vitals:    03/16/22 1051   BP: 122/78          Physical Exam  Constitutional:       Appearance: He is well-developed. HENT:      Head: Normocephalic and atraumatic. Cardiovascular:      Pulses: Normal pulses. Comments: Warm extremities. Normal capillary refill. Pulmonary:      Effort: Pulmonary effort is normal.   Abdominal:      General: Abdomen is flat. Palpations: Abdomen is soft. Musculoskeletal:      Lumbar back: Negative right straight leg raise test and negative left straight leg raise test.   Skin:     General: Skin is warm and dry. Neurological:      Mental Status: He is alert and oriented to person, place, and time. Cranial Nerves: No cranial nerve deficit. Sensory: No sensory deficit. Motor: No atrophy or abnormal muscle tone. Deep Tendon Reflexes: Reflexes are normal and symmetric. Psychiatric:         Speech: Speech normal.         Behavior: Behavior normal.       Back Exam     Range of Motion   Extension: normal   Flexion: normal   Lateral bend right: normal   Lateral bend left: normal   Rotation right: normal   Rotation left: normal     Muscle Strength   Right Quadriceps:  5/5   Left Quadriceps:  5/5   Right Hamstrings:  5/5   Left Hamstrings:  5/5     Tests   Straight leg raise right: negative  Straight leg raise left: negative    Other   Toe walk: normal  Heel walk: normal  Sensation: normal  Gait: normal                                   Research  has found that  Spine injections    reduce pain and  give  better functional  outcomes. Assessment: This is a 42 y.o. male patient with:    Diagnosis:   Diagnosis Orders   1.  Lumbar facet joint syndrome     2. Panniculitis involving lumbar region         Medical Comorbidities:  As listed in the patient's past medical and surgical history    Functional Limitations:   Pain limits function and quality of life. Plan:     B L4-5 5- S1  Facet injections  Meds:   Controlled Substances Monitoring: Pt educated about the risks of taking opiates,including increased sedation, constipation, slowed breathing, tolerance, dependence,and addiction. New Prescriptions    No medications on file      No orders of the defined types were placed in this encounter. No orders of the defined types were placed in this encounter. No follow-ups on file. Opioid medication has  significant  risk  benefit concerns. We  instruct    our patients that  a Random Urine Drug Screen is required  along with a  an Opioid assessment questionaire such as ORT  or SOAPP  The patient expressed understanding of the above assessment and plan. Totaltime spent face to face with patient was 25 minutes inwhich  50% or more of the time was spent in counseling, education about risk andbenefits of the above plan, and coordination of care.

## 2022-12-31 LAB
CHOLESTEROL/HDL RATIO: 1.86
CHOLESTEROL: 80 MG/DL
HDLC SERPL-MCNC: 43 MG/DL (ref 45–60)
LDL CHOLESTEROL: -10 MG/DL
TRIGL SERPL-MCNC: 234 MG/DL
VLDLC SERPL CALC-MCNC: 47 MG/DL (ref 5–35)